# Patient Record
Sex: FEMALE | NOT HISPANIC OR LATINO | Employment: OTHER | URBAN - METROPOLITAN AREA
[De-identification: names, ages, dates, MRNs, and addresses within clinical notes are randomized per-mention and may not be internally consistent; named-entity substitution may affect disease eponyms.]

---

## 2017-03-09 ENCOUNTER — LAB REQUISITION (OUTPATIENT)
Dept: LAB | Facility: HOSPITAL | Age: 47
End: 2017-03-09

## 2017-03-09 DIAGNOSIS — R11.2 NAUSEA WITH VOMITING: ICD-10-CM

## 2017-03-09 DIAGNOSIS — K21.9 GASTRO-ESOPHAGEAL REFLUX DISEASE WITHOUT ESOPHAGITIS: ICD-10-CM

## 2017-03-09 DIAGNOSIS — K90.0 CELIAC DISEASE: ICD-10-CM

## 2017-03-09 PROCEDURE — 88305 TISSUE EXAM BY PATHOLOGIST: CPT | Performed by: INTERNAL MEDICINE

## 2017-03-10 LAB
CYTO UR: NORMAL
LAB AP CASE REPORT: NORMAL
LAB AP CLINICAL INFORMATION: NORMAL
Lab: NORMAL
PATH REPORT.FINAL DX SPEC: NORMAL
PATH REPORT.GROSS SPEC: NORMAL

## 2018-04-25 ENCOUNTER — OFFICE VISIT (OUTPATIENT)
Dept: NEUROSURGERY | Facility: CLINIC | Age: 48
End: 2018-04-25

## 2018-04-25 ENCOUNTER — HOSPITAL ENCOUNTER (OUTPATIENT)
Dept: GENERAL RADIOLOGY | Facility: HOSPITAL | Age: 48
Discharge: HOME | End: 2018-04-25
Attending: NEUROLOGICAL SURGERY | Admitting: NEUROLOGICAL SURGERY

## 2018-04-25 VITALS
DIASTOLIC BLOOD PRESSURE: 84 MMHG | BODY MASS INDEX: 36.67 KG/M2 | HEIGHT: 64 IN | SYSTOLIC BLOOD PRESSURE: 124 MMHG | TEMPERATURE: 99.2 F | WEIGHT: 214.8 LBS

## 2018-04-25 DIAGNOSIS — Z98.1 STATUS POST LUMBAR SPINAL FUSION: ICD-10-CM

## 2018-04-25 DIAGNOSIS — M54.42 ACUTE BILATERAL LOW BACK PAIN WITH BILATERAL SCIATICA: Primary | ICD-10-CM

## 2018-04-25 DIAGNOSIS — M54.41 ACUTE BILATERAL LOW BACK PAIN WITH BILATERAL SCIATICA: Primary | ICD-10-CM

## 2018-04-25 PROCEDURE — 72082 X-RAY EXAM ENTIRE SPI 2/3 VW: CPT

## 2018-04-25 PROCEDURE — 99203 OFFICE O/P NEW LOW 30 MIN: CPT | Performed by: NEUROLOGICAL SURGERY

## 2018-04-25 RX ORDER — NAPROXEN 500 MG/1
TABLET ORAL
COMMUNITY
Start: 2018-03-30

## 2018-04-25 RX ORDER — TRAMADOL HYDROCHLORIDE 50 MG/1
TABLET ORAL
COMMUNITY
Start: 2018-04-13 | End: 2018-07-20

## 2018-04-25 RX ORDER — RANITIDINE 300 MG/1
TABLET ORAL
COMMUNITY
Start: 2018-04-04

## 2018-04-25 RX ORDER — ALBUTEROL SULFATE 90 UG/1
AEROSOL, METERED RESPIRATORY (INHALATION)
COMMUNITY
Start: 2018-03-09

## 2018-04-25 RX ORDER — OMEPRAZOLE 40 MG/1
CAPSULE, DELAYED RELEASE ORAL
COMMUNITY
Start: 2018-03-30

## 2018-04-25 RX ORDER — HYDROCODONE BITARTRATE AND ACETAMINOPHEN 7.5; 325 MG/1; MG/1
1 TABLET ORAL EVERY 6 HOURS PRN
Qty: 45 TABLET | Refills: 0 | Status: SHIPPED | OUTPATIENT
Start: 2018-04-25 | End: 2018-06-22

## 2018-04-25 RX ORDER — CLONAZEPAM 1 MG/1
TABLET ORAL
COMMUNITY
Start: 2018-04-07 | End: 2018-06-22

## 2018-04-25 RX ORDER — ROPINIROLE 4 MG/1
TABLET, FILM COATED ORAL
COMMUNITY
Start: 2018-04-17

## 2018-04-25 RX ORDER — BENZONATATE 100 MG/1
CAPSULE ORAL
COMMUNITY
Start: 2018-04-10 | End: 2018-07-20

## 2018-04-30 ENCOUNTER — DOCUMENTATION (OUTPATIENT)
Dept: NEUROSURGERY | Facility: CLINIC | Age: 48
End: 2018-04-30

## 2018-04-30 ENCOUNTER — OFFICE VISIT (OUTPATIENT)
Dept: NEUROSURGERY | Facility: CLINIC | Age: 48
End: 2018-04-30

## 2018-04-30 VITALS
BODY MASS INDEX: 35.61 KG/M2 | WEIGHT: 208.6 LBS | HEIGHT: 64 IN | DIASTOLIC BLOOD PRESSURE: 70 MMHG | TEMPERATURE: 97.7 F | SYSTOLIC BLOOD PRESSURE: 110 MMHG

## 2018-04-30 DIAGNOSIS — M54.41 ACUTE BILATERAL LOW BACK PAIN WITH BILATERAL SCIATICA: Primary | ICD-10-CM

## 2018-04-30 DIAGNOSIS — M54.42 ACUTE BILATERAL LOW BACK PAIN WITH BILATERAL SCIATICA: Primary | ICD-10-CM

## 2018-04-30 DIAGNOSIS — M51.36 DDD (DEGENERATIVE DISC DISEASE), LUMBAR: ICD-10-CM

## 2018-04-30 DIAGNOSIS — Z98.1 STATUS POST LUMBAR SPINAL FUSION: ICD-10-CM

## 2018-04-30 PROCEDURE — 99214 OFFICE O/P EST MOD 30 MIN: CPT | Performed by: NEUROLOGICAL SURGERY

## 2018-04-30 RX ORDER — GABAPENTIN 300 MG/1
300 CAPSULE ORAL 3 TIMES DAILY
Qty: 90 CAPSULE | Refills: 5 | OUTPATIENT
Start: 2018-04-30 | End: 2018-07-20

## 2018-04-30 NOTE — PROGRESS NOTES
Subjective     Chief Complaint: Back pain    Patient ID: Maida Deng is a 47 y.o. female is here today for follow-up.    Back Pain   The current episode started 1 to 4 weeks ago. The problem occurs constantly. The problem has been gradually worsening since onset. The pain is present in the lumbar spine, thoracic spine, sacro-iliac and gluteal. The quality of the pain is described as aching. The pain radiates to the right foot and left foot. The pain is at a severity of 10/10. The pain is severe. The pain is the same all the time. The symptoms are aggravated by bending, position, sitting, standing and twisting. Stiffness is present all day. Associated symptoms include headaches, leg pain, numbness, paresthesias, tingling and weakness. Pertinent negatives include no abdominal pain, bladder incontinence, bowel incontinence, chest pain, dysuria, fever, paresis, pelvic pain, perianal numbness or weight loss. Risk factors include sedentary lifestyle and recent trauma. She has tried analgesics and bed rest for the symptoms. The treatment provided no relief.       This is a 47-year-old woman who has a remote history of an L2 compression fracture that was treated with a long segment posterior fusion many years ago.  She suffered a fall several weeks ago and has been experiencing low back pain with some radiating pain into her left leg since that time.  She had a MRI which was essentially nondiagnostic because of the susceptibility artifact from her implanted hardware.  I ordered a CT scan and scoliosis films and she presents today to discuss the results of the studies.  She reports no significant change in her symptoms since her visit last week.    The following portions of the patient's history were reviewed and updated as appropriate: allergies, current medications, past family history, past medical history, past social history, past surgical history and problem list.    Family history: No family history on  file.    Social history:   Social History     Social History   • Marital status: Single     Spouse name: N/A   • Number of children: N/A   • Years of education: N/A     Occupational History   • Not on file.     Social History Main Topics   • Smoking status: Current Every Day Smoker     Packs/day: 0.50     Types: Cigarettes   • Smokeless tobacco: Never Used   • Alcohol use Yes      Comment: social   • Drug use: No   • Sexual activity: Defer     Other Topics Concern   • Not on file     Social History Narrative   • No narrative on file       Review of Systems   Constitutional: Positive for activity change, diaphoresis and fatigue. Negative for appetite change, chills, fever, unexpected weight change and weight loss.   HENT: Negative for congestion, dental problem, drooling, ear discharge, ear pain, facial swelling, hearing loss, mouth sores, nosebleeds, postnasal drip, rhinorrhea, sinus pressure, sneezing, sore throat, tinnitus, trouble swallowing and voice change.    Eyes: Negative for photophobia, pain, discharge, redness, itching and visual disturbance.   Respiratory: Positive for cough, shortness of breath and wheezing. Negative for apnea, choking, chest tightness and stridor.    Cardiovascular: Negative for chest pain, palpitations and leg swelling.   Gastrointestinal: Positive for nausea. Negative for abdominal distention, abdominal pain, anal bleeding, blood in stool, bowel incontinence, constipation, diarrhea, rectal pain and vomiting.   Endocrine: Positive for polydipsia. Negative for cold intolerance, heat intolerance, polyphagia and polyuria.   Genitourinary: Negative for bladder incontinence, decreased urine volume, difficulty urinating, dysuria, enuresis, flank pain, frequency, genital sores, hematuria, pelvic pain and urgency.   Musculoskeletal: Positive for back pain and neck pain. Negative for arthralgias, gait problem, joint swelling, myalgias and neck stiffness.   Skin: Negative for color change,  "pallor, rash and wound.   Allergic/Immunologic: Negative for environmental allergies, food allergies and immunocompromised state.   Neurological: Positive for tingling, weakness, numbness, headaches and paresthesias. Negative for dizziness, tremors, seizures, syncope, facial asymmetry, speech difficulty and light-headedness.   Hematological: Negative for adenopathy. Bruises/bleeds easily.   Psychiatric/Behavioral: Positive for agitation, dysphoric mood and sleep disturbance. Negative for behavioral problems, confusion, decreased concentration, hallucinations, self-injury and suicidal ideas. The patient is nervous/anxious. The patient is not hyperactive.    All other systems reviewed and are negative.      Objective   Blood pressure 110/70, temperature 97.7 °F (36.5 °C), temperature source Temporal Artery , height 162.6 cm (64\"), weight 94.6 kg (208 lb 9.6 oz).  Body mass index is 35.81 kg/m².    Physical Exam   Constitutional: She is oriented to person, place, and time. She appears well-developed.  Non-toxic appearance.   HENT:   Head: Normocephalic and atraumatic.   Right Ear: Hearing normal.   Left Ear: Hearing normal.   Nose: Nose normal.   Eyes: Conjunctivae, EOM and lids are normal. Pupils are equal, round, and reactive to light.   Neck: Normal range of motion. No JVD present.   Cardiovascular: Normal rate and regular rhythm.    Pulses:       Radial pulses are 2+ on the right side, and 2+ on the left side.   Pulmonary/Chest: Effort normal. No stridor. No respiratory distress. She has no wheezes.   Musculoskeletal:        Lumbar back: She exhibits decreased range of motion, tenderness and pain.        Back:    Neurological: She is alert and oriented to person, place, and time. She displays normal reflexes. No cranial nerve deficit. She exhibits normal muscle tone. She displays a negative Romberg sign. Gait abnormal. Coordination normal. GCS eye subscore is 4. GCS verbal subscore is 5. GCS motor subscore is 6. "   Reflex Scores:       Patellar reflexes are 3+ on the right side and 3+ on the left side.       Achilles reflexes are 3+ on the right side and 3+ on the left side.  She has an antalgic gait.       Skin: Skin is warm and dry. No rash noted. No erythema.   Psychiatric: She has a normal mood and affect. Her behavior is normal. Judgment and thought content normal.   Nursing note and vitals reviewed.        Assessment/Plan     Independent Review of Radiographic Studies:      Available for my review is a CT scan of the thoracic and lumbar spine that was performed on 4/27/18.  These demonstrate spinal fixation hardware at L4, L3, L1, and T12.  There are also sublaminar hooks and Baker rods.  No comparison studies are available, however the spinal instrumentation appears to be in good position.  There is loss of lumbar lordosis, however no lucencies around the screws, or hardware failure is identified.  She does have a chronic appearing fracture of the L2 vertebral body she has moderate degenerative disc disease at L5-S1 as well as adjacent level disease in her thoracic spine above her construct.    Medical Decision Making:      This is a 47-year-old woman with subacute low back pain and left-sided sciatica.  There is no clear imaging correlate for her symptoms, so she is an excellent candidate for conservative treatment.  I reviewed the signs and symptoms of lumbosacral radiculopathy and cauda equina with her.  I directed her to contact my office with new, or worsening symptoms.  She can follow-up with me in 3 months after she undergone some physical therapy and pain management.    Maida was seen today for back pain.    Diagnoses and all orders for this visit:    Acute bilateral low back pain with bilateral sciatica  -     Ambulatory Referral to Physical Therapy Evaluate and treat  -     Ambulatory Referral to Pain Management  -     gabapentin (NEURONTIN) 300 MG capsule; Take 1 capsule by mouth 3 (Three) Times a  Day. Take 1 caps PO QHS x3days, Take 1 caps BID PO x3days, Take 1 caps TID PO thereafter    Status post lumbar spinal fusion  -     Ambulatory Referral to Physical Therapy Evaluate and treat  -     Ambulatory Referral to Pain Management  -     gabapentin (NEURONTIN) 300 MG capsule; Take 1 capsule by mouth 3 (Three) Times a Day. Take 1 caps PO QHS x3days, Take 1 caps BID PO x3days, Take 1 caps TID PO thereafter    DDD (degenerative disc disease), lumbar  -     Ambulatory Referral to Physical Therapy Evaluate and treat  -     Ambulatory Referral to Pain Management  -     gabapentin (NEURONTIN) 300 MG capsule; Take 1 capsule by mouth 3 (Three) Times a Day. Take 1 caps PO QHS x3days, Take 1 caps BID PO x3days, Take 1 caps TID PO thereafter        Return in about 3 months (around 7/30/2018).           This document signed by TONE Avalos MD April 30, 2018 1:51 PM

## 2018-06-22 ENCOUNTER — OFFICE VISIT (OUTPATIENT)
Dept: NEUROSURGERY | Facility: CLINIC | Age: 48
End: 2018-06-22

## 2018-06-22 ENCOUNTER — TELEPHONE (OUTPATIENT)
Dept: NEUROSURGERY | Facility: CLINIC | Age: 48
End: 2018-06-22

## 2018-06-22 VITALS
BODY MASS INDEX: 33.97 KG/M2 | TEMPERATURE: 98.5 F | WEIGHT: 199 LBS | HEIGHT: 64 IN | DIASTOLIC BLOOD PRESSURE: 60 MMHG | SYSTOLIC BLOOD PRESSURE: 120 MMHG

## 2018-06-22 DIAGNOSIS — M79.602 LEFT ARM PAIN: Primary | ICD-10-CM

## 2018-06-22 DIAGNOSIS — M54.42 CHRONIC MIDLINE LOW BACK PAIN WITH LEFT-SIDED SCIATICA: Primary | ICD-10-CM

## 2018-06-22 DIAGNOSIS — G89.29 CHRONIC MIDLINE LOW BACK PAIN WITH LEFT-SIDED SCIATICA: Primary | ICD-10-CM

## 2018-06-22 DIAGNOSIS — M79.602 LEFT ARM PAIN: ICD-10-CM

## 2018-06-22 DIAGNOSIS — M54.40 LOW BACK PAIN WITH SCIATICA, SCIATICA LATERALITY UNSPECIFIED, UNSPECIFIED BACK PAIN LATERALITY, UNSPECIFIED CHRONICITY: ICD-10-CM

## 2018-06-22 DIAGNOSIS — Z72.0 TOBACCO ABUSE: ICD-10-CM

## 2018-06-22 DIAGNOSIS — M54.2 NECK PAIN: ICD-10-CM

## 2018-06-22 PROCEDURE — 99214 OFFICE O/P EST MOD 30 MIN: CPT | Performed by: PHYSICIAN ASSISTANT

## 2018-06-22 NOTE — PROGRESS NOTES
"Subjective     Chief Complaint:  Maida Deng is a 47 y.o. female here today for follow up of low back pain and left leg pain. She also has developed neck and left arm pain since she was last seen.    History of Present Illness  Ms Deng is a 48yo R handed F with PMhx of L2 compression fracture and subsequent T12-L4 fusion done in  at .  She is a 1/2 PPD smoker x many years as well.  She was last seen by Dr. Avalos on 18 with complaints of new low back pain radiating into her left leg and foot. The pain courses down her left anterior thigh and into her calf with pain, numbness, and tingling in all the toes on the left side. She denies right sided pain, bowel or bladder dysfunction. She has an antalgic gait and tends to fall occasionally. She denies olga weakness, and does not use a can, walker, or other support.  Dr. Avalos had reviewd a CT scan of her lumbar spine at her last visit which showed chronic appearing fracture of the L2 vertebral body,  moderate degenerative disc disease at L5-S1 as well as adjacent level disease in her thoracic spine above her construct. He recommended physical therapy and pain management with an GIL.  She has started physical therapy although she has not been in \"a couple of weeks\" because the pain had become worse.  She had her first GIL last 18, and states that it caused nausea, vomiting, and a migraine, but did not help with her leg pain. She has a follow up with pain management next week.    Since she was last seen, she has also started having neck pain and left arm pain and numbness. This came on suddenly during a  and she noted pain into her left arm, forearm,and hand. She said that her \"whole left side started swelling up\" and she had nausea and vomiting associated with it. She went to her local ER, and they treated her pain and nausea, but no imaging or SKG was done. She is a current every day smoker.    The following portions of the " patient's history were reviewed and updated as appropriate: allergies, current medications, past family history, past medical history, past social history, past surgical history and problem list.    Past Medical History:   Diagnosis Date   • Headache    • History of transfusion      Past Surgical History:   Procedure Laterality Date   • BACK SURGERY  2001     Dr. Verdugo     Family history: History reviewed. No pertinent family history.    Social history:   Social History     Social History   • Marital status: Single     Spouse name: N/A   • Number of children: N/A   • Years of education: N/A     Occupational History   • Not on file.     Social History Main Topics   • Smoking status: Current Every Day Smoker     Packs/day: 0.50     Types: Cigarettes   • Smokeless tobacco: Never Used   • Alcohol use Yes      Comment: social   • Drug use: No   • Sexual activity: Defer     Other Topics Concern   • Not on file     Social History Narrative   • No narrative on file       Review of Systems   Constitutional: Positive for activity change, diaphoresis and fatigue. Negative for appetite change, chills, fever and unexpected weight change.   HENT: Negative for congestion, dental problem, drooling, ear discharge, ear pain, facial swelling, hearing loss, mouth sores, nosebleeds, postnasal drip, rhinorrhea, sinus pressure, sneezing, sore throat, tinnitus, trouble swallowing and voice change.    Eyes: Negative for photophobia, pain, discharge, redness, itching and visual disturbance.   Respiratory: Positive for cough and choking. Negative for apnea, chest tightness, shortness of breath, wheezing and stridor.    Cardiovascular: Positive for leg swelling. Negative for chest pain and palpitations.   Gastrointestinal: Positive for nausea and vomiting. Negative for abdominal distention, abdominal pain, anal bleeding, blood in stool, constipation, diarrhea and rectal pain.   Endocrine: Positive for polydipsia and polyuria. Negative for  "cold intolerance, heat intolerance and polyphagia.   Genitourinary: Positive for decreased urine volume and difficulty urinating. Negative for dysuria, enuresis, flank pain, frequency, genital sores, hematuria and urgency.   Musculoskeletal: Positive for back pain, neck pain and neck stiffness. Negative for arthralgias, gait problem, joint swelling and myalgias.   Skin: Negative for color change, pallor, rash and wound.   Allergic/Immunologic: Negative for environmental allergies, food allergies and immunocompromised state.   Neurological: Positive for weakness, numbness and headaches. Negative for dizziness, tremors, seizures, syncope, facial asymmetry, speech difficulty and light-headedness.   Hematological: Negative for adenopathy. Bruises/bleeds easily.   Psychiatric/Behavioral: Positive for dysphoric mood and sleep disturbance. Negative for agitation, behavioral problems, confusion, decreased concentration, hallucinations, self-injury and suicidal ideas. The patient is nervous/anxious and is hyperactive.        Objective   Blood pressure 120/60, temperature 98.5 °F (36.9 °C), temperature source Temporal Artery , height 162.6 cm (64.02\"), weight 90.3 kg (199 lb).  Body mass index is 34.14 kg/m².    Physical Exam   Constitutional: She is oriented to person, place, and time. She appears well-developed and well-nourished. No distress.   HENT:   Head: Normocephalic and atraumatic.   Eyes: EOM are normal. Pupils are equal, round, and reactive to light.   Neck: Normal range of motion. No tracheal deviation present. No thyromegaly present.   Pulmonary/Chest: Effort normal. No respiratory distress.   Musculoskeletal:   UE strength 5/5 on right, 4/5 on the left. No muscle atrophy or potter's present.  LE strength 5/5 and equal bilaterally. No swelling noted.   Gait is antalgic and favors her left leg. She walks slowly and balance is poor, but she does not use cane or walker. Strength is intact to direct testing. She " "notes that dorsiflexion against pressure increases her pain   Neurological: She is alert and oriented to person, place, and time. She displays a negative Romberg sign. Gait abnormal.   Reflex Scores:       Bicep reflexes are 2+ on the right side and 2+ on the left side.       Brachioradialis reflexes are 2+ on the right side and 2+ on the left side.       Patellar reflexes are 3+ on the right side and 3+ on the left side.       Achilles reflexes are 2+ on the right side and 2+ on the left side.  Skin: She is not diaphoretic.         Assessment/Plan   Ms. Deng is continuing to struggle; however, her symptoms are vague; she states her pain is \"down her whole left side\".  I am concerned about her new onset left arm pain with exertion/stress considering her age and smoking history. I will obtain an EKG today and the patient was counseled again on smoking cessation.  We will order left upper and left lower extremity NCV/EMG for Dr. Avalos to review, and will plan to see her back in a couple of weeks once these are complete.     Maida was seen today for back pain and leg pain.    Diagnoses and all orders for this visit:    Chronic midline low back pain with left-sided sciatica  -     EMG Left Extremties; Future  -     Nerve Conduction Test Left Extremities; Future    Neck pain  -     EMG Left Extremties; Future  -     Nerve Conduction Test Left Extremities; Future    Left arm pain  -     Ambulatory Referral to Cardiology  -     ECG 12 Lead; Future  -     EMG Left Extremties; Future  -     Nerve Conduction Test Left Extremities; Future    Tobacco abuse  -     Ambulatory Referral to Cardiology  -     ECG 12 Lead; Future        No Follow-up on file.      "

## 2018-06-28 ENCOUNTER — TELEPHONE (OUTPATIENT)
Dept: NEUROSURGERY | Facility: CLINIC | Age: 48
End: 2018-06-28

## 2018-07-20 ENCOUNTER — OFFICE VISIT (OUTPATIENT)
Dept: NEUROSURGERY | Facility: CLINIC | Age: 48
End: 2018-07-20

## 2018-07-20 VITALS
BODY MASS INDEX: 33.87 KG/M2 | WEIGHT: 198.4 LBS | HEIGHT: 64 IN | TEMPERATURE: 98.1 F | DIASTOLIC BLOOD PRESSURE: 64 MMHG | SYSTOLIC BLOOD PRESSURE: 102 MMHG

## 2018-07-20 DIAGNOSIS — M43.25 FUSION OF SPINE OF THORACOLUMBAR REGION: ICD-10-CM

## 2018-07-20 DIAGNOSIS — Z98.1 STATUS POST LUMBAR SPINAL FUSION: ICD-10-CM

## 2018-07-20 DIAGNOSIS — M54.40 LOW BACK PAIN WITH SCIATICA, SCIATICA LATERALITY UNSPECIFIED, UNSPECIFIED BACK PAIN LATERALITY, UNSPECIFIED CHRONICITY: ICD-10-CM

## 2018-07-20 DIAGNOSIS — M51.36 DDD (DEGENERATIVE DISC DISEASE), LUMBAR: Primary | ICD-10-CM

## 2018-07-20 PROCEDURE — 99214 OFFICE O/P EST MOD 30 MIN: CPT | Performed by: NEUROLOGICAL SURGERY

## 2018-07-20 NOTE — PROGRESS NOTES
Subjective     Chief Complaint: Low back pain status post thoracolumbar fusion    Patient ID: Maida Deng is a 47 y.o. female is here today for follow-up.    Back Pain   The current episode started more than 1 month ago. The problem occurs constantly. The problem is unchanged. The pain is present in the lumbar spine, thoracic spine, sacro-iliac and gluteal. The quality of the pain is described as aching. The pain radiates to the right foot and left foot. The pain is at a severity of 10/10. The pain is severe. The pain is the same all the time. The symptoms are aggravated by bending, position, sitting, standing and twisting. Stiffness is present all day. Associated symptoms include leg pain, numbness (BLE), paresthesias, tingling and weakness (BLE). Pertinent negatives include no abdominal pain, bladder incontinence, bowel incontinence, chest pain, dysuria, fever, headaches, paresis, pelvic pain, perianal numbness or weight loss. Risk factors include sedentary lifestyle and recent trauma. She has tried analgesics and bed rest for the symptoms. The treatment provided no relief.       This is a 47-year-old woman who underwent a thoracal lumbar fusion for a L2 burst fracture many years ago.  She presented to my clinic 3 months ago  with chief complaints of worsening low back pain.  I referred her for conservative treatment.  She presents today to report that there has been no significant improvement in her low back pain.      The following portions of the patient's history were reviewed and updated as appropriate: allergies, current medications, past family history, past medical history, past social history, past surgical history and problem list.    Family history: No family history on file.    Social history:   Social History     Social History   • Marital status: Single     Spouse name: N/A   • Number of children: N/A   • Years of education: N/A     Occupational History   • Not on file.     Social History Main  Topics   • Smoking status: Current Every Day Smoker     Packs/day: 0.50     Types: Cigarettes   • Smokeless tobacco: Never Used   • Alcohol use Yes      Comment: social   • Drug use: No   • Sexual activity: Defer     Other Topics Concern   • Not on file     Social History Narrative   • No narrative on file       Review of Systems   Constitutional: Positive for activity change, appetite change, diaphoresis and fatigue. Negative for chills, fever, unexpected weight change and weight loss.   HENT: Negative for congestion, dental problem, drooling, ear discharge, ear pain, facial swelling, hearing loss, mouth sores, nosebleeds, postnasal drip, rhinorrhea, sinus pressure, sneezing, sore throat, tinnitus, trouble swallowing and voice change.    Eyes: Negative for photophobia, pain, discharge, redness, itching and visual disturbance.   Respiratory: Negative for apnea, cough, choking, chest tightness, shortness of breath, wheezing and stridor.    Cardiovascular: Positive for leg swelling (BLE- mid calf down to feet). Negative for chest pain and palpitations.   Gastrointestinal: Positive for nausea. Negative for abdominal distention, abdominal pain, anal bleeding, blood in stool, bowel incontinence, constipation, diarrhea, rectal pain and vomiting.   Endocrine: Positive for polydipsia. Negative for cold intolerance, heat intolerance, polyphagia and polyuria.   Genitourinary: Negative for bladder incontinence, decreased urine volume, difficulty urinating, dysuria, enuresis, flank pain, frequency, genital sores, hematuria, pelvic pain and urgency.   Musculoskeletal: Positive for back pain, neck pain and neck stiffness. Negative for arthralgias, gait problem, joint swelling and myalgias.   Skin: Negative for color change, pallor, rash and wound.   Allergic/Immunologic: Negative for environmental allergies, food allergies and immunocompromised state.   Neurological: Positive for tingling, weakness (BLE), numbness (BLE) and  "paresthesias. Negative for dizziness, tremors, seizures, syncope, facial asymmetry, speech difficulty, light-headedness and headaches.   Hematological: Negative for adenopathy. Bruises/bleeds easily.   Psychiatric/Behavioral: Positive for dysphoric mood and sleep disturbance. Negative for agitation, behavioral problems, confusion, decreased concentration, hallucinations, self-injury and suicidal ideas. The patient is nervous/anxious. The patient is not hyperactive.    All other systems reviewed and are negative.      Objective   Blood pressure 102/64, temperature 98.1 °F (36.7 °C), temperature source Temporal Artery , height 162.6 cm (64.02\"), weight 90 kg (198 lb 6.4 oz).  Body mass index is 34.03 kg/m².    Physical Exam   Constitutional: She is oriented to person, place, and time. She appears well-developed.  Non-toxic appearance.   HENT:   Head: Normocephalic and atraumatic.   Right Ear: Hearing normal.   Left Ear: Hearing normal.   Nose: Nose normal.   Eyes: Pupils are equal, round, and reactive to light. Conjunctivae, EOM and lids are normal.   Neck: Normal range of motion. No JVD present.   Cardiovascular: Normal rate and regular rhythm.    Pulses:       Radial pulses are 2+ on the right side, and 2+ on the left side.   Pulmonary/Chest: Effort normal. No stridor. No respiratory distress. She has no wheezes.   Musculoskeletal:        Lumbar back: She exhibits decreased range of motion, tenderness and pain.        Back:    Neurological: She is alert and oriented to person, place, and time. She displays normal reflexes. No cranial nerve deficit. She exhibits normal muscle tone. She displays a negative Romberg sign. Gait abnormal. Coordination normal. GCS eye subscore is 4. GCS verbal subscore is 5. GCS motor subscore is 6.   Reflex Scores:       Patellar reflexes are 3+ on the right side and 3+ on the left side.       Achilles reflexes are 3+ on the right side and 3+ on the left side.  She has an antalgic " gait.       Skin: Skin is warm and dry. No rash noted. No erythema.   Psychiatric: She has a normal mood and affect. Her behavior is normal. Judgment and thought content normal.   Nursing note and vitals reviewed.        Assessment/Plan     Independent Review of Radiographic Studies:      She has no new imaging for me to review.  She has pedicle screws at L3 and L4 as well as L1 and sublaminar hooks with Baker rods in the lower thoracic spine.  She has advanced degenerative disc disease at L5-S1.    Medical Decision Making:      This is a 47-year-old woman with a thoracal lumbar fusion and worsening degenerative disc disease with refractory low back pain at L5-S1.  She may be a candidate for extension of her fusion to the sacrum and ilium.  I referred her over to the [facility] for the complex spine specialist to assess her for extension of her fusion.  If she is not a candidate for surgery, then I would recommend a pain pump, and I can certainly help facilitate that.  She can follow-up with me on an as-needed basis.    Maida was seen today for back pain.    Diagnoses and all orders for this visit:    DDD (degenerative disc disease), lumbar  -     Ambulatory Referral to Neurosurgery    Low back pain with sciatica, sciatica laterality unspecified, unspecified back pain laterality, unspecified chronicity  -     Ambulatory Referral to Neurosurgery    Status post lumbar spinal fusion  -     Ambulatory Referral to Neurosurgery    Fusion of spine of thoracolumbar region  -     Ambulatory Referral to Neurosurgery        Return if symptoms worsen or fail to improve.           This document signed by TONE Avalos MD July 20, 2018 10:37 AM